# Patient Record
Sex: FEMALE | Race: BLACK OR AFRICAN AMERICAN | Employment: OTHER | URBAN - METROPOLITAN AREA
[De-identification: names, ages, dates, MRNs, and addresses within clinical notes are randomized per-mention and may not be internally consistent; named-entity substitution may affect disease eponyms.]

---

## 2018-01-07 ENCOUNTER — HOSPITAL ENCOUNTER (EMERGENCY)
Facility: HOSPITAL | Age: 33
Discharge: HOME | End: 2018-01-07
Payer: MEDICAID

## 2018-01-07 VITALS
HEART RATE: 78 BPM | WEIGHT: 293 LBS | BODY MASS INDEX: 50.02 KG/M2 | OXYGEN SATURATION: 99 % | SYSTOLIC BLOOD PRESSURE: 142 MMHG | TEMPERATURE: 98 F | DIASTOLIC BLOOD PRESSURE: 77 MMHG | HEIGHT: 64 IN | RESPIRATION RATE: 18 BRPM

## 2018-01-07 DIAGNOSIS — L03.012 PARONYCHIA OF FINGER, LEFT: Primary | ICD-10-CM

## 2018-01-07 PROCEDURE — 99283 EMERGENCY DEPT VISIT LOW MDM: CPT

## 2018-01-07 RX ORDER — HYDROCHLOROTHIAZIDE 12.5 MG/1
12.5 CAPSULE, GELATIN COATED ORAL EVERY MORNING
COMMUNITY

## 2018-01-07 RX ORDER — CEFADROXIL 500 MG/1
500 CAPSULE ORAL 2 TIMES DAILY
Qty: 14 CAPSULE | Refills: 0 | Status: SHIPPED | OUTPATIENT
Start: 2018-01-07 | End: 2018-01-14

## 2018-01-07 NOTE — ED PROVIDER NOTES
Patient Seen in: [facility] AND [facility] Emergency Department    History   Patient presents with:  Rash Skin Problem (integumentary)    Stated Complaint: left thumb infection    Patient presents into the emergency room for evaluation of left thumb irritation. tenderness of digits 2 through 5. Radial pulses are 3+. Left thumb: No palpable phalanx or DIP joint. There is mild tenderness accompanied with mild swelling without erythema to the paronychiaradial aspect of the left thumb.   There is no streaking, or a

## 2018-01-07 NOTE — ED INITIAL ASSESSMENT (HPI)
Pt with left thumb swelling near nail- possibly paronychia. Pt denies fever at home. 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

## 2018-03-14 ENCOUNTER — APPOINTMENT (OUTPATIENT)
Dept: GENERAL RADIOLOGY | Facility: HOSPITAL | Age: 33
End: 2018-03-14
Attending: NURSE PRACTITIONER
Payer: MEDICAID

## 2018-03-14 ENCOUNTER — HOSPITAL ENCOUNTER (EMERGENCY)
Facility: HOSPITAL | Age: 33
Discharge: HOME | End: 2018-03-14
Payer: MEDICAID

## 2018-03-14 VITALS
BODY MASS INDEX: 51.38 KG/M2 | HEART RATE: 108 BPM | RESPIRATION RATE: 22 BRPM | SYSTOLIC BLOOD PRESSURE: 131 MMHG | OXYGEN SATURATION: 97 % | WEIGHT: 290 LBS | HEIGHT: 63 IN | TEMPERATURE: 101 F | DIASTOLIC BLOOD PRESSURE: 63 MMHG

## 2018-03-14 DIAGNOSIS — J06.9 UPPER RESPIRATORY TRACT INFECTION, UNSPECIFIED TYPE: Primary | ICD-10-CM

## 2018-03-14 LAB
B-HCG UR QL: NEGATIVE
S PYO AG THROAT QL: NEGATIVE

## 2018-03-14 PROCEDURE — 99283 EMERGENCY DEPT VISIT LOW MDM: CPT

## 2018-03-14 PROCEDURE — 71046 X-RAY EXAM CHEST 2 VIEWS: CPT | Performed by: NURSE PRACTITIONER

## 2018-03-14 PROCEDURE — 87430 STREP A AG IA: CPT

## 2018-03-14 PROCEDURE — 81025 URINE PREGNANCY TEST: CPT

## 2018-03-14 RX ORDER — PSEUDOEPHEDRINE HCL 120 MG/1
120 TABLET, FILM COATED, EXTENDED RELEASE ORAL EVERY 12 HOURS PRN
Qty: 10 TABLET | Refills: 0 | Status: SHIPPED | OUTPATIENT
Start: 2018-03-14 | End: 2018-03-21

## 2018-03-14 RX ORDER — IBUPROFEN 600 MG/1
600 TABLET ORAL ONCE
Status: COMPLETED | OUTPATIENT
Start: 2018-03-14 | End: 2018-03-14

## 2018-03-14 RX ORDER — ACETAMINOPHEN 325 MG/1
650 TABLET ORAL ONCE
Status: COMPLETED | OUTPATIENT
Start: 2018-03-14 | End: 2018-03-14

## 2018-03-14 NOTE — ED PROVIDER NOTES
Patient Seen in: [facility] AND [facility] Emergency Department    History   CC: cough  HPI: Serenitywilberto Radha 27year old female w/ hx HTN who presents to the ER c/o cough, runny nose, congestion, sore throat and fever for the last 3 days.   Denies any shortness of amrta - PERRL, sclera not injected, no discharge noted, no periorbital edema  ENT - EAC bilaterally without discharge, TM pearly grey with COL visualized appropriately bilaterally.    No pain with palpation to frontal or maxillary sinuses, + clear nasal drainage can be additionally helpful for a sore throat. Using a humidifier in the bedroom at night, and sleeping propped up on pillows/somewhat of an incline can also be helpful.   Additionally sinus rinses such as the [place] pot, or Ocean nasal spray types of sinus

## 2018-03-14 NOTE — ED INITIAL ASSESSMENT (HPI)
Patient presents with cough, congestion and fever since Monday. Patient took 200 mg of Advil at 1230.  Patient denies n/v/d.

## 2018-03-26 ENCOUNTER — HOSPITAL ENCOUNTER (EMERGENCY)
Facility: HOSPITAL | Age: 33
Discharge: HOME | End: 2018-03-26
Attending: EMERGENCY MEDICINE
Payer: MEDICAID

## 2018-03-26 VITALS
SYSTOLIC BLOOD PRESSURE: 137 MMHG | BODY MASS INDEX: 47.8 KG/M2 | DIASTOLIC BLOOD PRESSURE: 71 MMHG | HEIGHT: 64 IN | HEART RATE: 83 BPM | RESPIRATION RATE: 16 BRPM | TEMPERATURE: 98 F | OXYGEN SATURATION: 100 % | WEIGHT: 280 LBS

## 2018-03-26 DIAGNOSIS — L03.012 PARONYCHIA OF LEFT THUMB: Primary | ICD-10-CM

## 2018-03-26 PROCEDURE — 99283 EMERGENCY DEPT VISIT LOW MDM: CPT

## 2018-03-26 PROCEDURE — 10060 I&D ABSCESS SIMPLE/SINGLE: CPT

## 2018-03-26 RX ORDER — CEPHALEXIN 500 MG/1
500 CAPSULE ORAL 3 TIMES DAILY
Qty: 21 CAPSULE | Refills: 0 | Status: SHIPPED | OUTPATIENT
Start: 2018-03-26 | End: 2018-04-02

## 2018-03-26 NOTE — ED PROVIDER NOTES
Patient Seen in: [facility] AND [facility] Emergency Department    History   Patient presents with:  Swelling Edema (cardiovascular, metabolic)    Stated Complaint: Finger swelling (1st digit L hand)    HPI    The patient is a 38-year-old female who presents wit Differential diagnosis includes paronychia, felon, herpetic meghan        ED Course   Labs Reviewed - No data to display    ED Course as of Mar 26 0648  ------------------------------------------------------------       NAZANIN Sánchez incised and

## 2018-03-26 NOTE — ED INITIAL ASSESSMENT (HPI)
Pt reports swelling to her L thumb. States that she has had the same thing happen to his finger and was given antibiotics. Reports that she recently got her nails done and noticed the swelling begin after that.

## 2018-03-26 NOTE — ED NOTES
Pt c/o swelling to left thumb. Pt states that she got her nails done on Wednesday and her finger started swelling on Thursday. Pt denies pain, but c/o pressure to the thumb. Pt states that she had the same sx about 3 months ago to the same finger.  Will con

## 2018-03-26 NOTE — ED NOTES
Pt provided with discharge instructions. Verbalized understanding for plan of care at home and follow up.  All questions/concerns addressed prior to discharge. '

## (undated) NOTE — ED AVS SNAPSHOT
Linda Sarabia   MRN: [id number]    Department:  [facility] Emergency Department   Date of Visit:  3/14/2018           Disclosure     Insurance plans vary and the physician(s) referred by the ER may not be covered by your plan.  Please contact your CARE PHYSICIAN AT ONCE OR RETURN IMMEDIATELY TO THE EMERGENCY DEPARTMENT. If you have been prescribed any medication(s), please fill your prescription right away and begin taking the medication(s) as directed.   If you believe that any of the medications

## (undated) NOTE — ED AVS SNAPSHOT
Zelda Cantu   MRN: [id number]    Department:  [facility] Emergency Department   Date of Visit:  3/26/2018           Disclosure     Insurance plans vary and the physician(s) referred by the ER may not be covered by your plan.  Please contact your CARE PHYSICIAN AT ONCE OR RETURN IMMEDIATELY TO THE EMERGENCY DEPARTMENT. If you have been prescribed any medication(s), please fill your prescription right away and begin taking the medication(s) as directed.   If you believe that any of the medications

## (undated) NOTE — ED AVS SNAPSHOT
Quique Oneal   MRN: [id number]    Department:  [facility] Emergency Department   Date of Visit:  1/7/2018           Disclosure     Insurance plans vary and the physician(s) referred by the ER may not be covered by your plan.  Please contact your i CARE PHYSICIAN AT ONCE OR RETURN IMMEDIATELY TO THE EMERGENCY DEPARTMENT. If you have been prescribed any medication(s), please fill your prescription right away and begin taking the medication(s) as directed.   If you believe that any of the medications